# Patient Record
Sex: FEMALE | Race: WHITE
[De-identification: names, ages, dates, MRNs, and addresses within clinical notes are randomized per-mention and may not be internally consistent; named-entity substitution may affect disease eponyms.]

---

## 2019-11-12 ENCOUNTER — HOSPITAL ENCOUNTER (EMERGENCY)
Dept: HOSPITAL 75 - ER FS | Age: 64
Discharge: HOME | End: 2019-11-12
Payer: COMMERCIAL

## 2019-11-12 VITALS — BODY MASS INDEX: 37.93 KG/M2 | HEIGHT: 62.99 IN | WEIGHT: 214.07 LBS

## 2019-11-12 VITALS — DIASTOLIC BLOOD PRESSURE: 65 MMHG | SYSTOLIC BLOOD PRESSURE: 157 MMHG

## 2019-11-12 DIAGNOSIS — R53.83: Primary | ICD-10-CM

## 2019-11-12 DIAGNOSIS — F32.9: ICD-10-CM

## 2019-11-12 DIAGNOSIS — I10: ICD-10-CM

## 2019-11-12 DIAGNOSIS — Z88.2: ICD-10-CM

## 2019-11-12 DIAGNOSIS — F41.9: ICD-10-CM

## 2019-11-12 DIAGNOSIS — R06.00: ICD-10-CM

## 2019-11-12 LAB
ALBUMIN SERPL-MCNC: 4.1 GM/DL (ref 3.2–4.5)
ALP SERPL-CCNC: 53 U/L (ref 40–136)
ALT SERPL-CCNC: 18 U/L (ref 0–55)
BASOPHILS # BLD AUTO: 0 10^3/UL (ref 0–0.1)
BASOPHILS NFR BLD AUTO: 0 % (ref 0–10)
BILIRUB SERPL-MCNC: 0.2 MG/DL (ref 0.1–1)
BUN/CREAT SERPL: 23
CALCIUM SERPL-MCNC: 9.4 MG/DL (ref 8.5–10.1)
CHLORIDE SERPL-SCNC: 101 MMOL/L (ref 98–107)
CO2 SERPL-SCNC: 22 MMOL/L (ref 21–32)
CREAT SERPL-MCNC: 0.64 MG/DL (ref 0.6–1.3)
EOSINOPHIL # BLD AUTO: 0 10^3/UL (ref 0–0.3)
EOSINOPHIL NFR BLD AUTO: 0 % (ref 0–10)
ERYTHROCYTE [DISTWIDTH] IN BLOOD BY AUTOMATED COUNT: 15.2 % (ref 10–14.5)
GFR SERPLBLD BASED ON 1.73 SQ M-ARVRAT: > 60 ML/MIN
GLUCOSE SERPL-MCNC: 110 MG/DL (ref 70–105)
HCT VFR BLD CALC: 38 % (ref 35–52)
HGB BLD-MCNC: 12.4 G/DL (ref 11.5–16)
LYMPHOCYTES # BLD AUTO: 1.9 X 10^3 (ref 1–4)
LYMPHOCYTES NFR BLD AUTO: 15 % (ref 12–44)
MANUAL DIFFERENTIAL PERFORMED BLD QL: NO
MCH RBC QN AUTO: 27 PG (ref 25–34)
MCHC RBC AUTO-ENTMCNC: 32 G/DL (ref 32–36)
MCV RBC AUTO: 83 FL (ref 80–99)
MONOCYTES # BLD AUTO: 0.9 X 10^3 (ref 0–1)
MONOCYTES NFR BLD AUTO: 7 % (ref 0–12)
NEUTROPHILS # BLD AUTO: 9.4 X 10^3 (ref 1.8–7.8)
NEUTROPHILS NFR BLD AUTO: 78 % (ref 42–75)
PLATELET # BLD: 342 10^3/UL (ref 130–400)
PMV BLD AUTO: 8.7 FL (ref 7.4–10.4)
POTASSIUM SERPL-SCNC: 3.8 MMOL/L (ref 3.6–5)
PROT SERPL-MCNC: 7.5 GM/DL (ref 6.4–8.2)
SODIUM SERPL-SCNC: 136 MMOL/L (ref 135–145)
WBC # BLD AUTO: 12.2 10^3/UL (ref 4.3–11)

## 2019-11-12 PROCEDURE — 80053 COMPREHEN METABOLIC PANEL: CPT

## 2019-11-12 PROCEDURE — 84484 ASSAY OF TROPONIN QUANT: CPT

## 2019-11-12 PROCEDURE — 93005 ELECTROCARDIOGRAM TRACING: CPT

## 2019-11-12 PROCEDURE — 83880 ASSAY OF NATRIURETIC PEPTIDE: CPT

## 2019-11-12 PROCEDURE — 85025 COMPLETE CBC W/AUTO DIFF WBC: CPT

## 2019-11-12 PROCEDURE — 71046 X-RAY EXAM CHEST 2 VIEWS: CPT

## 2019-11-12 PROCEDURE — 36415 COLL VENOUS BLD VENIPUNCTURE: CPT

## 2019-11-12 NOTE — ED GENERAL
General


Chief Complaint:  General Problems/Pain


Stated Complaint:  SOB; LETHARGY


Nursing Triage Note:  


Patient reports she started taking melatonin, celexa and zoloft on Wednesday of 


last week, states she feels very fatigued and has trouble staying awake in the 


evenings. Patient states she is so fatigued that she feels as though she is 


going to pass out and that she is having difficulty breathing. She also reports 


bilateral burning chest pain that she states is similar to her normal heartburn 


rated 5/10.


Nursing Sepsis Screen:  No Definite Risk


Source of Information:  Patient


Exam Limitations:  No Limitations





History of Present Illness


Date Seen by Provider:  Nov 12, 2019


Time Seen by Provider:  14:00


Initial Comments


The patient is a very pleasant 64-year-old female who presents for evaluation of

shortness of breath and fatigue over the last few days. She states that she 

started to take Celexa, Zoloft, and melatonin 6 days ago. She states since that 

time she has been very fatigued. However, today she felt like she was going to 

pass out because she was short of breath. She denies pain with inspirations, 

productive cough, hemoptysis, back or flank pain, abdominal pain, fevers or 

chills. She is alert and oriented 4, calm, and appears to be in no distress 

this time. She is alert and oriented 4, calm, speaking in full sentences, and 

appears to be in no respiratory distress at this time.


Timing/Duration:  1-2 Days


Severity:  Moderate


Associated Systoms:  Shortness of Air





Allergies and Home Medications


Allergies


Uncoded Allergies:  


     sulfa (Allergy, Unknown, 11/12/19)





Home Medications


Calcium Carbonate/Vitamin D3 1 Each Tab.chew, 1 EACH PO DAILY, (Reported)


Hydrochlorothiazide 25 Mg Tablet, 1 EACH PO DAILY, (Reported)


Krill Oil/Omega-3/Dha/Epa 1 Each Capsule.dr, 1 EACH PO DAILY, (Reported)


Metronidazole 500 Mg Tab, 1 EACH PO TID, (Reported)


   NEW PRESCRIPTION CALLED TO [facility] 


Tolterodine Tartrate 4 Mg Cap.sr.24h, 1 EACH PO DAILY, (Reported)





Patient Home Medication List


Home Medication List Reviewed:  Yes





Review of Systems


Review of Systems


Constitutional:  no symptoms reported


EENTM:  no symptoms reported


Respiratory:  no symptoms reported, short of breath


Cardiovascular:  no symptoms reported


Gastrointestinal:  no symptoms reported


Genitourinary:  no symptoms reported


Musculoskeletal:  no symptoms reported


Skin:  no symptoms reported


Psychiatric/Neurological:  No Symptoms Reported


Hematologic/Lymphatic:  No Symptoms Reported


Immunological/Allergic:  no symptoms reported





All Other Systems Reviewed


Negative Unless Noted:  Yes





Past Medical-Social-Family Hx


Past Med/Social Hx:  Reviewed Nursing Past Med/Soc Hx


Patient Social History


Alcohol Use:  Denies Use


Recreational Drug Use:  No


Smoking Status:  Never a Smoker


2nd Hand Smoke Exposure:  No


Recent Foreign Travel:  No


Contact w/Someone Who Travel:  No


Recent Infectious Disease Expo:  No


Recent Hopitalizations:  No


Physical Abuse:  No


Sexual Abuse:  No


Mistreated:  No


Fear:  No





Seasonal Allergies


Seasonal Allergies:  No





Past Medical History


Surgeries:  No


Respiratory:  No


Cardiac:  Yes


Hypertension


Neurological:  No


Genitourinary:  No


Gastrointestinal:  Yes (IBS)


Musculoskeletal:  No


Endocrine:  No


HEENT:  No


Cancer:  No


Psychosocial:  Yes


Anxiety, Depression


Integumentary:  No





Physical Exam


Vital Signs





Vital Signs - First Documented








 11/12/19





 13:55


 


Temp 36.7


 


Pulse 86


 


Resp 19


 


B/P (MAP) 170/85 (113)


 


Pulse Ox 96


 


O2 Delivery Room Air





Capillary Refill : Less Than 3 Seconds


Height, Weight, BMI


Height: '"


Weight: lbs. oz. kg; 37.00 BMI


Method:


General Appearance:  No Apparent Distress, WD/WN, Anxious


Eyes:  Bilateral Eye Normal Inspection, Bilateral Eye PERRL, Bilateral Eye EOMI


HEENT:  PERRL/EOMI, Pharynx Normal


Neck:  Full Range of Motion, Normal Inspection, Non Tender, Supple


Respiratory:  Chest Non Tender, Lungs Clear, Normal Breath Sounds


Cardiovascular:  Regular Rate, Rhythm, No Edema, No JVD


Gastrointestinal:  Normal Bowel Sounds, No Organomegaly, No Pulsatile Mass, Non 

Tender, Soft


Extremity:  Normal Capillary Refill, Normal Inspection, Non Tender, No Pedal 

Edema


Neurologic/Psychiatric:  Alert, Oriented x3, No Motor/Sensory Deficits, Normal 

Mood/Affect


Skin:  Normal Color, Warm/Dry





Progress/Results/Core Measures


Suspected Sepsis


Recent Fever Within 48 Hours:  No


Infection Criteria Present:  None


New/Unexplained  Altered Menta:  No


Sepsis Screen:  No Definite Risk


SIRS


Temperature: 


Pulse: 86 


Respiratory Rate: 19


 


Laboratory Tests


11/12/19 14:20: White Blood Count 12.2H


Blood Pressure 170 /85 


Mean: 113


 


Laboratory Tests


11/12/19 14:20: 


Creatinine 0.64, Platelet Count 342, Total Bilirubin 0.2








Results/Orders


Lab Results





Laboratory Tests








Test


 11/12/19


14:20 Range/Units


 


 


White Blood Count


 12.2 H


 4.3-11.0


10^3/uL


 


Red Blood Count


 4.62 


 4.35-5.85


10^6/uL


 


Hemoglobin 12.4  11.5-16.0  G/DL


 


Hematocrit 38  35-52  %


 


Mean Corpuscular Volume 83  80-99  FL


 


Mean Corpuscular Hemoglobin 27  25-34  PG


 


Mean Corpuscular Hemoglobin


Concent 32 


 32-36  G/DL





 


Red Cell Distribution Width 15.2 H 10.0-14.5  %


 


Platelet Count


 342 


 130-400


10^3/uL


 


Mean Platelet Volume 8.7  7.4-10.4  FL


 


Neutrophils (%) (Auto) 78 H 42-75  %


 


Lymphocytes (%) (Auto) 15  12-44  %


 


Monocytes (%) (Auto) 7  0-12  %


 


Eosinophils (%) (Auto) 0  0-10  %


 


Basophils (%) (Auto) 0  0-10  %


 


Neutrophils # (Auto) 9.4 H 1.8-7.8  X 10^3


 


Lymphocytes # (Auto) 1.9  1.0-4.0  X 10^3


 


Monocytes # (Auto) 0.9  0.0-1.0  X 10^3


 


Eosinophils # (Auto)


 0.0 


 0.0-0.3


10^3/uL


 


Basophils # (Auto)


 0.0 


 0.0-0.1


10^3/uL


 


Sodium Level 136  135-145  MMOL/L


 


Potassium Level 3.8  3.6-5.0  MMOL/L


 


Chloride Level 101    MMOL/L


 


Carbon Dioxide Level 22  21-32  MMOL/L


 


Anion Gap 13  5-14  MMOL/L


 


Blood Urea Nitrogen 15  7-18  MG/DL


 


Creatinine


 0.64 


 0.60-1.30


MG/DL


 


Estimat Glomerular Filtration


Rate > 60 


  





 


BUN/Creatinine Ratio 23   


 


Glucose Level 110 H   MG/DL


 


Calcium Level 9.4  8.5-10.1  MG/DL


 


Corrected Calcium 9.3  8.5-10.1  MG/DL


 


Total Bilirubin 0.2  0.1-1.0  MG/DL


 


Aspartate Amino Transf


(AST/SGOT) 14 


 5-34  U/L





 


Alanine Aminotransferase


(ALT/SGPT) 18 


 0-55  U/L





 


Alkaline Phosphatase 53    U/L


 


Troponin I < 0.30  <0.30  NG/ML


 


Pro-B-Type Natriuretic Peptide < 5.0  <75.0  PG/ML


 


Total Protein 7.5  6.4-8.2  GM/DL


 


Albumin 4.1  3.2-4.5  GM/DL








My Orders





Orders - YANELI SIMPSON KEN DO


Cbc With Automated Diff (11/12/19 14:21)


Comprehensive Metabolic Panel (11/12/19 14:21)


Probnp Fs (11/12/19 14:21)


Troponin I Fs (11/12/19 14:21)


Chest Pa/Lat (2 View) (11/12/19 14:21)


Ed Iv/Invasive Line Start (11/12/19 14:34)


Ekg Tracing (11/12/19 14:34)


Ns Iv 1000 Ml (Sodium Chloride 0.9%) (11/12/19 15:45)


Ns Iv 1000 Ml (Sodium Chloride 0.9%) (11/12/19 15:43)





Vital Signs/I&O











 11/12/19





 13:55


 


Temp 36.7


 


Pulse 86


 


Resp 19


 


B/P (MAP) 170/85 (113)


 


Pulse Ox 96


 


O2 Delivery Room Air





Capillary Refill : Less Than 3 Seconds








Blood Pressure Mean:                    113


POS





Progress Note :  


Progress Note


@1610 - Patient updated on lab and imaging results. Her symptoms are likely 

secondary to the 3 recent medications she started taking. Advised that she 

discuss this with her PCP in the next 1-2 days and she states that she is going 

to see her primary care doctor tomorrow and that there will be a medication 

change. She states that she is feeling better now. She is currently receiving 

some IV fluids to help with her fatigue. She states her shortness of breath is 

resolved. Workup today fails to reveal any emergent pathology. The patient is 

stable for discharge home at this time. Advised close follow-up with the 

patient's PCP in the next 1-2 days and return to the Emergency Department 

immediately for new or worsening symptoms.





ECG


EKG :  


Comment


@1404 - Normal sinus rhythm, rate of 83, normal axis, no acute ischemic findings

noted, no STEMI, reviewed and interpreted by myself





Departure


Impression





   Primary Impression:  


   Fatigue


   Additional Impression:  


   Dyspnea


Disposition:  01 HOME, SELF-CARE


Condition:  Stable





Departure-Patient Inst.


Decision time for Depature:  16:21


Referrals:  


ML CARLTON DO (PCP/Family)


Primary Care Physician


Patient Instructions:  Fatigue, Shortness of Breath (Dyspnea) (DC)





Add. Discharge Instructions:  


As discussed, your symptoms are likely secondary to medication side effects of y

our new medications. Follow-up with your doctor in the next 1-2 days. Return to 

the emergency Department immediately for new or worsening symptoms.











YANELI SIMPSON DO              Nov 12, 2019 14:40


POS

## 2019-11-12 NOTE — DIAGNOSTIC IMAGING REPORT
INDICATION: Chest pain.



PA and lateral chest obtained at 2:17 p.m. 



COMPARISON: There is no prior study for comparison.



FINDINGS: Heart is borderline in size. Mediastinal silhouette is

unremarkable except for moderate tortuosity of the aorta. There

is no focal infiltrate or pneumothorax or pleural fluid.



IMPRESSION:



Borderline heart size with tortuous aorta. No acute infiltrate or

pleural fluid.



Dictated by: 



  Dictated on workstation # [id number]

## 2019-11-18 ENCOUNTER — HOSPITAL ENCOUNTER (OUTPATIENT)
Dept: HOSPITAL 75 - SLEEP | Age: 64
Discharge: HOME | End: 2019-11-18
Attending: FAMILY MEDICINE
Payer: COMMERCIAL

## 2019-11-18 DIAGNOSIS — R06.83: Primary | ICD-10-CM

## 2019-11-18 DIAGNOSIS — Z53.9: ICD-10-CM

## 2019-11-18 DIAGNOSIS — R53.82: ICD-10-CM

## 2020-03-09 ENCOUNTER — HOSPITAL ENCOUNTER (OUTPATIENT)
Dept: HOSPITAL 75 - SLEEP | Age: 65
End: 2020-03-09
Attending: FAMILY MEDICINE
Payer: COMMERCIAL

## 2020-03-09 DIAGNOSIS — G47.33: Primary | ICD-10-CM

## 2022-06-14 ENCOUNTER — HOSPITAL ENCOUNTER (OUTPATIENT)
Dept: HOSPITAL 75 - RAD | Age: 67
End: 2022-06-14
Attending: INTERNAL MEDICINE
Payer: COMMERCIAL
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
## 2022-06-14 DIAGNOSIS — S69.92XD: Primary | ICD-10-CM

## 2022-06-14 PROCEDURE — 73218 MRI UPPER EXTREMITY W/O DYE: CPT

## 2022-06-14 NOTE — DIAGNOSTIC IMAGING REPORT
Exam: MRI left hand without contrast.



Date: June 14, 2022.



Indication: 67-year-old female, fall in May 2022. Left thumb

pain.



Comparison: None.



Technique: Multiple noncontrast MRI sequences of the left hand

were obtained.



Findings:



There is no identified acute fracture. There is no identified

bone contusion.



There is no dislocation. There is no sizable joint effusion.



There is dorsal subcutaneous edema centered in the region of the

first metacarpophalangeal joint. There is also low-level edema in

the first dorsal interosseous muscle.



The visualized flexor and extensor tendons are intact. There is

limited evaluation of the collateral ligaments of the first

metacarpal phalangeal joint relating to lack of dedicated imaging

and obliquity of imaging.



Impression:

1. No acute fracture or bone contusion.

2. Predominantly dorsal subcutaneous edema centered near the

first metacarpophalangeal joint as well as low-level edema in the

first dorsal interosseous muscle which may reflect soft tissue

contusion and low-grade muscle contusion. A focal low-grade

muscle strain is also considered for the area of abnormal

intramuscular signal.

3. Negative for extensor or flexor tendon tear.

4. Limited evaluation of the collateral ligaments at the level of

the first metacarpal phalangeal joint given large field-of-view

of imaging and obliquity of imaging.



Dictated by: 



  Dictated on workstation # [id number]